# Patient Record
Sex: MALE | Race: WHITE | NOT HISPANIC OR LATINO | Employment: OTHER | URBAN - NONMETROPOLITAN AREA
[De-identification: names, ages, dates, MRNs, and addresses within clinical notes are randomized per-mention and may not be internally consistent; named-entity substitution may affect disease eponyms.]

---

## 2019-12-05 ENCOUNTER — OFFICE VISIT (OUTPATIENT)
Dept: ORTHOPEDIC SURGERY | Facility: CLINIC | Age: 59
End: 2019-12-05

## 2019-12-05 VITALS
OXYGEN SATURATION: 99 % | DIASTOLIC BLOOD PRESSURE: 110 MMHG | WEIGHT: 170.1 LBS | HEART RATE: 60 BPM | HEIGHT: 68 IN | TEMPERATURE: 98.1 F | BODY MASS INDEX: 25.78 KG/M2 | SYSTOLIC BLOOD PRESSURE: 160 MMHG

## 2019-12-05 DIAGNOSIS — M25.512 ACUTE PAIN OF LEFT SHOULDER: Primary | ICD-10-CM

## 2019-12-05 DIAGNOSIS — M75.52 SUBACROMIAL BURSITIS OF LEFT SHOULDER JOINT: ICD-10-CM

## 2019-12-05 PROCEDURE — 99202 OFFICE O/P NEW SF 15 MIN: CPT | Performed by: ORTHOPAEDIC SURGERY

## 2019-12-05 NOTE — PROGRESS NOTES
Dell Colón is a 59 y.o. male   Primary provider:  Provider, No Known       Chief Complaint   Patient presents with   • Left Shoulder - Pain, Initial Evaluation       HISTORY OF PRESENT ILLNESS: Left shoulder pain.  Patient states he tripped over a chain saw (not running) and landed on back of left shoulder.    11/16/19 Pain scale today 6/10    This is the first office visit for evaluation of left shoulder pain.    Mr. Spivey is 59 years old and right-hand dominant.  About 2 weeks ago he tripped and fell backwards landing on his left shoulder.  He had prompt onset of pain in the shoulder.  He denies any previous problems with the left shoulder.  However he did have problems with the right shoulder in the past which responded well to injection therapy.  Complains of pain in the shoulder and proximal arm worse at night and also worse with abduction.  Treatment has included oral steroids and Naprosyn.  He had brief improvement of his symptoms with the steroids.  There is been no numbness or tingling in the limb.    He is taking no medications and has no allergies.  He does not smoke.  His general health is good.  He is employed as a pharmacist.    Pain   This is a new problem. The current episode started 1 to 4 weeks ago. The problem occurs constantly (moderate). The problem has been unchanged. Associated symptoms comments: Stabbing aching and burning and clicking/popping. Exacerbated by: driving and sleeping. He has tried rest and NSAIDs for the symptoms. The treatment provided no relief.        CONCURRENT MEDICAL HISTORY:    History reviewed. No pertinent past medical history.    No Known Allergies    No current outpatient medications on file.    No past surgical history on file.    History reviewed. No pertinent family history.     Social History     Socioeconomic History   • Marital status:      Spouse name: Not on file   • Number of children: Not on file   • Years of education: Not on file   •  "Highest education level: Not on file   Tobacco Use   • Smoking status: Never Smoker   • Smokeless tobacco: Never Used   Substance and Sexual Activity   • Alcohol use: Yes     Comment: 1/day, 5/week   • Drug use: No   • Sexual activity: Yes     Partners: Female        Review of Systems   Musculoskeletal:        Left shoulder pain   Psychiatric/Behavioral: Positive for sleep disturbance.   Review of systems is positive as noted in history of present illness.      PHYSICAL EXAMINATION:       BP (!) 160/110 (BP Location: Right arm, Patient Position: Sitting, Cuff Size: Adult)   Pulse 60   Temp 98.1 °F (36.7 °C)   Ht 172.7 cm (68\")   Wt 77.2 kg (170 lb 1.6 oz)   SpO2 99%   BMI 25.86 kg/m²     Physical Exam in general he is thin but otherwise healthy-appearing alert and pleasant.  He responds appropriately to questions and commands.    GAIT:     [x]  Normal  []  Antalgic    Assistive device: []  None  []  Walker     []  Crutches  []  Cane     []  Wheelchair  []  Stretcher    Ortho Exam is directed to the upper extremities.  Biceps contours are normal.  With some encouragement he can demonstrate full active elevation of the left shoulder with complaints of pain.  The acromioclavicular joint is nontender.  There is no scapulothoracic tenderness.  Strength of abduction is mildly decreased with complaints of pain.  Strength of internal and external rotation appear normal.  Impingement testing produces mild discomfort.  Shoulder motion is smooth.  Radial pulses strong.  Sensory exam is intact to soft touch.  There is a subcutaneous mass overlying the upper portion of the trapezius on the right just lateral to the midline.  This has the feel of a lipoma.  There is no associated tenderness.    Xr Shoulder 2+ View Left    Result Date: 12/5/2019  Ordering Provider:  Zeyad Brandt MD Ordering Diagnosis/Indication:  Acute pain of left shoulder Procedure:  XR SHOULDER 2+ VW LEFT Exam Date:  12/5/19 COMPARISON: None      " Radiographs of the left shoulder 2 AP views done today show moderate degenerative change of the acromioclavicular joint.  The glenohumeral joint is unremarkable.  The acromiohumeral space is normal. Zeyad Brandt MD 12/5/19      No results found.        ASSESSMENT: Left shoulder pain probably secondary to rotator cuff tendinitis.    The natural history of the disorder and treatment options were reviewed.  I think the prognosis is good for solution of his symptoms with nonoperative care.    He was given Thera-Band and instructed in strengthening exercises for the shoulder rotators.  Potential benefits of injection therapy were discussed.  He wished to proceed with this.    The left shoulder was prepped.  Skin was infiltrated with 1% Xylocaine with epinephrine.  The subacromial bursa was then injected with a mixture of Marcaine and Kenalog and Xylocaine with epinephrine.  There were no complications and he reported improvement in pain with shoulder motion.    Return here in 1 month if his symptoms have not improved.    Diagnoses and all orders for this visit:    Acute pain of left shoulder  -     XR Shoulder 2+ View Left          PLAN    No Follow-up on file.    Zeyad Brandt MD

## 2020-01-28 ENCOUNTER — OFFICE VISIT (OUTPATIENT)
Dept: SURGERY | Facility: CLINIC | Age: 60
End: 2020-01-28

## 2020-01-28 VITALS
TEMPERATURE: 97 F | BODY MASS INDEX: 25.79 KG/M2 | HEIGHT: 68 IN | HEART RATE: 65 BPM | WEIGHT: 170.2 LBS | DIASTOLIC BLOOD PRESSURE: 84 MMHG | SYSTOLIC BLOOD PRESSURE: 140 MMHG

## 2020-01-28 DIAGNOSIS — K40.90 LEFT INGUINAL HERNIA: Primary | ICD-10-CM

## 2020-01-28 PROCEDURE — 99203 OFFICE O/P NEW LOW 30 MIN: CPT | Performed by: SURGERY

## 2020-01-28 NOTE — PROGRESS NOTES
CHIEF COMPLAINT:    Left inguinal hernia    HISTORY OF PRESENT ILLNESS:    Dell Colón is a 59 y.o. male who presents to discuss what he believes is a left inguinal hernia.  Intermittently over the last several weeks the patient has noticed a bulge in his left groin which progressively enlarges throughout the day.  This is associated with some dull pain in the area.  He has no obstructive symptoms.  He notes that the bulge reduces on its own with rest and can also be manually reduced if needed.    No pertinent past medical history.    pertinent surgical history: Vasectomy    Prior to Admission medications    Not on File       No Known Allergies    History reviewed. No pertinent family history.    Social History     Socioeconomic History   • Marital status:      Spouse name: Not on file   • Number of children: Not on file   • Years of education: Not on file   • Highest education level: Not on file   Tobacco Use   • Smoking status: Never Smoker   • Smokeless tobacco: Never Used   Substance and Sexual Activity   • Alcohol use: Yes     Comment: Occasionally   • Drug use: No   • Sexual activity: Yes     Partners: Female       Review of Systems   Constitutional: Negative for activity change, appetite change, chills and fever.   HENT: Negative for hearing loss, nosebleeds and trouble swallowing.    Cardiovascular: Negative for chest pain, palpitations and leg swelling.   Gastrointestinal: Negative for abdominal distention, abdominal pain, anal bleeding, blood in stool, constipation, diarrhea, nausea, rectal pain and vomiting.   Endocrine: Negative for cold intolerance, heat intolerance, polydipsia and polyuria.   Genitourinary: Negative for decreased urine volume, difficulty urinating, dysuria, enuresis, frequency, hematuria and urgency.   Musculoskeletal: Negative for arthralgias, back pain, gait problem, myalgias and neck pain.   Skin: Negative for pallor, rash and wound.   Allergic/Immunologic: Negative for  "immunocompromised state.   Neurological: Negative for dizziness, seizures, weakness, light-headedness, numbness and headaches.   Psychiatric/Behavioral: Negative for agitation and behavioral problems. The patient is not nervous/anxious.        Objective     /84   Pulse 65   Temp 97 °F (36.1 °C) (Temporal)   Ht 172.7 cm (68\")   Wt 77.2 kg (170 lb 3.2 oz)   BMI 25.88 kg/m²     Physical Exam   Constitutional: He is oriented to person, place, and time. He appears well-developed and well-nourished.   HENT:   Head: Normocephalic and atraumatic.   Nose: Nose normal.   Eyes: Conjunctivae and EOM are normal. Right eye exhibits no discharge. Left eye exhibits no discharge.   Neck: Trachea normal, normal range of motion and phonation normal. Neck supple. No JVD present. No tracheal deviation and no edema present. No thyromegaly present.   Cardiovascular: Normal rate, regular rhythm and normal heart sounds. Exam reveals no gallop and no friction rub.   No murmur heard.  Pulmonary/Chest: Effort normal and breath sounds normal. No accessory muscle usage. No respiratory distress. He has no decreased breath sounds. He has no wheezes. He has no rales. He exhibits no tenderness.   Abdominal: Soft. He exhibits no distension, no fluid wave, no ascites, no pulsatile midline mass and no mass. There is no tenderness. There is no rebound and no guarding. A hernia is present. Hernia confirmed positive in the right inguinal area and confirmed positive in the left inguinal area.       Musculoskeletal: Normal range of motion. He exhibits no edema, tenderness or deformity.   Lymphadenopathy:     He has no cervical adenopathy.        Left: No supraclavicular adenopathy present.   Neurological: He is alert and oriented to person, place, and time. He has normal strength. No cranial nerve deficit.   Skin: Skin is warm and dry. No rash noted. He is not diaphoretic. No erythema. No pallor.   Psychiatric: He has a normal mood and affect. " His speech is normal and behavior is normal. Judgment and thought content normal. Cognition and memory are normal.   Vitals reviewed.        ASSESSMENT:    Left inguinal hernia    PLAN:    He has a left inguinal hernia which is easily reducible and minimally symptomatic.  At this point he would like to pursue watchful waiting.  He will call or return if he desires repair of the hernia.  The signs and symptoms of incarceration and obstruction due to the hernia were discussed with the patient.  He understands that this would be a surgical emergency and he would need to seek medical care immediately for these problems.          This document has been electronically signed by Zeyad Castillo MD on January 28, 2020 9:44 AM

## 2020-03-16 ENCOUNTER — OFFICE VISIT (OUTPATIENT)
Dept: SURGERY | Facility: CLINIC | Age: 60
End: 2020-03-16

## 2020-03-16 VITALS
BODY MASS INDEX: 26.01 KG/M2 | HEIGHT: 68 IN | HEART RATE: 78 BPM | WEIGHT: 171.6 LBS | DIASTOLIC BLOOD PRESSURE: 82 MMHG | SYSTOLIC BLOOD PRESSURE: 150 MMHG | TEMPERATURE: 97 F

## 2020-03-16 DIAGNOSIS — K40.90 LEFT INGUINAL HERNIA: Primary | ICD-10-CM

## 2020-03-16 PROCEDURE — 99212 OFFICE O/P EST SF 10 MIN: CPT | Performed by: SURGERY

## 2020-03-16 RX ORDER — IBUPROFEN 200 MG
200 TABLET ORAL EVERY 6 HOURS PRN
COMMUNITY
End: 2022-01-12

## 2020-03-16 NOTE — PROGRESS NOTES
CHIEF COMPLAINT:    Chief Complaint   Patient presents with   • Follow-up     Michelle - Schedule [facility] surgery       HISTORY OF PRESENT ILLNESS:    Dell Colón is a 59 y.o. male who was seen previously in January for a symptomatic left inguinal hernia.  He comes in today desiring repair of this hernia.  He states that he has some discomfort typically in the evenings after standing all day.  He notes that the hernia continues to be reducible.    EXAM:  Vitals:    03/16/20 0854   BP: 150/82   Pulse: 78   Temp: 97 °F (36.1 °C)         No acute distress    ASSESSMENT:    Symptomatic left inguinal hernia    PLAN:    Unfortunately, due to the restrictions on scheduling elective surgical procedures at this time I cannot schedule him for his hernia repair as it is not an emergency.  I discussed this with him and he is understanding of this.  We will call and schedule him when possible.          This document has been electronically signed by Zeyad Castillo MD on March 16, 2020 09:48

## 2022-01-12 ENCOUNTER — LAB (OUTPATIENT)
Dept: LAB | Facility: HOSPITAL | Age: 62
End: 2022-01-12

## 2022-01-12 ENCOUNTER — OFFICE VISIT (OUTPATIENT)
Dept: FAMILY MEDICINE CLINIC | Facility: CLINIC | Age: 62
End: 2022-01-12

## 2022-01-12 VITALS
WEIGHT: 181.8 LBS | HEART RATE: 88 BPM | OXYGEN SATURATION: 99 % | BODY MASS INDEX: 27.55 KG/M2 | SYSTOLIC BLOOD PRESSURE: 166 MMHG | DIASTOLIC BLOOD PRESSURE: 102 MMHG | HEIGHT: 68 IN

## 2022-01-12 DIAGNOSIS — Z76.89 ENCOUNTER TO ESTABLISH CARE: ICD-10-CM

## 2022-01-12 DIAGNOSIS — Z13.29 SCREENING FOR THYROID DISORDER: ICD-10-CM

## 2022-01-12 DIAGNOSIS — Z11.59 NEED FOR HEPATITIS C SCREENING TEST: ICD-10-CM

## 2022-01-12 DIAGNOSIS — Z13.220 SCREENING FOR HYPERCHOLESTEROLEMIA: ICD-10-CM

## 2022-01-12 DIAGNOSIS — Z12.11 SCREENING FOR COLON CANCER: ICD-10-CM

## 2022-01-12 DIAGNOSIS — I10 PRIMARY HYPERTENSION: ICD-10-CM

## 2022-01-12 DIAGNOSIS — I10 PRIMARY HYPERTENSION: Primary | ICD-10-CM

## 2022-01-12 DIAGNOSIS — Z12.5 SCREENING FOR PROSTATE CANCER: ICD-10-CM

## 2022-01-12 LAB
ALBUMIN SERPL-MCNC: 4.8 G/DL (ref 3.5–5.2)
ALBUMIN/GLOB SERPL: 2.1 G/DL
ALP SERPL-CCNC: 80 U/L (ref 39–117)
ALT SERPL W P-5'-P-CCNC: 21 U/L (ref 1–41)
ANION GAP SERPL CALCULATED.3IONS-SCNC: 6.8 MMOL/L (ref 5–15)
AST SERPL-CCNC: 20 U/L (ref 1–40)
BILIRUB SERPL-MCNC: 0.5 MG/DL (ref 0–1.2)
BUN SERPL-MCNC: 17 MG/DL (ref 8–23)
BUN/CREAT SERPL: 15.3 (ref 7–25)
CALCIUM SPEC-SCNC: 9.6 MG/DL (ref 8.6–10.5)
CHLORIDE SERPL-SCNC: 107 MMOL/L (ref 98–107)
CHOLEST SERPL-MCNC: 182 MG/DL (ref 0–200)
CO2 SERPL-SCNC: 28.2 MMOL/L (ref 22–29)
CREAT SERPL-MCNC: 1.11 MG/DL (ref 0.76–1.27)
GFR SERPL CREATININE-BSD FRML MDRD: 67 ML/MIN/1.73
GLOBULIN UR ELPH-MCNC: 2.3 GM/DL
GLUCOSE SERPL-MCNC: 111 MG/DL (ref 65–99)
HCV AB SER DONR QL: NORMAL
HDLC SERPL-MCNC: 47 MG/DL (ref 40–60)
LDLC SERPL CALC-MCNC: 123 MG/DL (ref 0–100)
LDLC/HDLC SERPL: 2.61 {RATIO}
POTASSIUM SERPL-SCNC: 4 MMOL/L (ref 3.5–5.2)
PROT SERPL-MCNC: 7.1 G/DL (ref 6–8.5)
PSA SERPL-MCNC: 2.57 NG/ML (ref 0–4)
SODIUM SERPL-SCNC: 142 MMOL/L (ref 136–145)
TRIGL SERPL-MCNC: 61 MG/DL (ref 0–150)
TSH SERPL DL<=0.05 MIU/L-ACNC: 1.05 UIU/ML (ref 0.27–4.2)
VLDLC SERPL-MCNC: 12 MG/DL (ref 5–40)

## 2022-01-12 PROCEDURE — 86803 HEPATITIS C AB TEST: CPT

## 2022-01-12 PROCEDURE — 80061 LIPID PANEL: CPT

## 2022-01-12 PROCEDURE — 99202 OFFICE O/P NEW SF 15 MIN: CPT | Performed by: NURSE PRACTITIONER

## 2022-01-12 PROCEDURE — 80053 COMPREHEN METABOLIC PANEL: CPT

## 2022-01-12 PROCEDURE — 84443 ASSAY THYROID STIM HORMONE: CPT

## 2022-01-12 PROCEDURE — G0103 PSA SCREENING: HCPCS

## 2022-01-12 RX ORDER — LISINOPRIL 10 MG/1
10 TABLET ORAL DAILY
Qty: 30 TABLET | Refills: 1 | Status: SHIPPED | OUTPATIENT
Start: 2022-01-12 | End: 2022-01-25 | Stop reason: DRUGHIGH

## 2022-01-12 NOTE — PROGRESS NOTES
"Chief Complaint  Establish Care (elevated bp)    Subjective  Encounter to establish care.  Has been having episodes of high blood pressure over the last one to two months.        Dell Colón presents to [facility] [place]  Hypertension  This is a new problem. The current episode started more than 1 month ago. The problem has been gradually worsening since onset. The problem is uncontrolled. There are no associated agents to hypertension. Risk factors for coronary artery disease include male gender and family history. Past treatments include nothing. Current antihypertension treatment includes nothing. The current treatment provides no improvement.       Objective   Vital Signs:   BP (!) 166/102   Pulse 88   Ht 172.7 cm (68\")   Wt 82.5 kg (181 lb 12.8 oz)   SpO2 99%   BMI 27.64 kg/m²     Physical Exam  Vitals and nursing note reviewed.   Constitutional:       Appearance: He is well-developed.   HENT:      Head: Normocephalic.      Right Ear: External ear normal.      Left Ear: External ear normal.   Eyes:      Pupils: Pupils are equal, round, and reactive to light.   Cardiovascular:      Rate and Rhythm: Normal rate and regular rhythm.      Heart sounds: Normal heart sounds.   Pulmonary:      Effort: Pulmonary effort is normal.      Breath sounds: Normal breath sounds.   Abdominal:      General: Bowel sounds are normal.      Palpations: Abdomen is soft.   Musculoskeletal:         General: Normal range of motion.      Cervical back: Normal range of motion and neck supple.   Skin:     General: Skin is warm.      Capillary Refill: Capillary refill takes less than 2 seconds.   Neurological:      Mental Status: He is alert and oriented to person, place, and time.   Psychiatric:         Behavior: Behavior normal.        Result Review :                   Assessment and Plan    Diagnoses and all orders for this visit:    1. Primary hypertension (Primary)  -     CBC & " Differential; Future  -     Comprehensive Metabolic Panel; Future  -     lisinopril (PRINIVIL,ZESTRIL) 10 MG tablet; Take 1 tablet by mouth Daily.  Dispense: 30 tablet; Refill: 1    2. Need for hepatitis C screening test  -     Hepatitis C Antibody; Future    3. Screening for hypercholesterolemia  -     Lipid Panel; Future    4. Screening for thyroid disorder  -     TSH; Future    5. Screening for prostate cancer  -     PSA Screen; Future    6. Screening for colon cancer  -     Cologuard - Stool, Per Rectum; Future    7. Encounter to establish care    1.  Primary hypertension:  Complete CBC and chemistry panel as ordered and will notify of results when available  Begin lisinopril as prescribed  Educated on possible side effects of this medication including not limited to increased risk for dry cough, hypotension  Reduce sodium intake to no more than 1500 mg/day  Will check blood pressure 2-3 times per week and bring log of readings to next appointment    2.  Need for hepatitis C screening test:  Complete hepatitis C antibody as ordered and will notify of results when available    3.  Screening for hypercholesterolemia:  Complete fasting lipid panel as ordered and will notify of results when available    4.  Screening for thyroid disorder:  Complete TSH as ordered and will notify of results when available    5.  Screening for prostate cancer:  Complete PSA as ordered and will notify of results when available    6.  Screening for colon cancer:  Order placed for Cologuard and will notify of results when available    7.  Encounter to establish care:    I spent 19 minutes caring for Dell on this date of service. This time includes time spent by me in the following activities:preparing for the visit, reviewing tests, obtaining and/or reviewing a separately obtained history, performing a medically appropriate examination and/or evaluation , counseling and educating the patient/family/caregiver, ordering medications,  tests, or procedures and documenting information in the medical record  Follow Up   Return in about 4 weeks (around 2/9/2022) for Annual physical.  Patient was given instructions and counseling regarding his condition or for health maintenance advice. Please see specific information pulled into the AVS if appropriate.         This document has been electronically signed by ANTHONY Sanford on January 12, 2022 09:56 CST

## 2022-01-13 ENCOUNTER — LAB (OUTPATIENT)
Dept: LAB | Facility: HOSPITAL | Age: 62
End: 2022-01-13

## 2022-01-13 DIAGNOSIS — I10 PRIMARY HYPERTENSION: ICD-10-CM

## 2022-01-13 LAB
BASOPHILS # BLD AUTO: 0.05 10*3/MM3 (ref 0–0.2)
BASOPHILS NFR BLD AUTO: 0.8 % (ref 0–1.5)
DEPRECATED RDW RBC AUTO: 38.8 FL (ref 37–54)
EOSINOPHIL # BLD AUTO: 0.32 10*3/MM3 (ref 0–0.4)
EOSINOPHIL NFR BLD AUTO: 4.8 % (ref 0.3–6.2)
ERYTHROCYTE [DISTWIDTH] IN BLOOD BY AUTOMATED COUNT: 12.8 % (ref 12.3–15.4)
HCT VFR BLD AUTO: 44.7 % (ref 37.5–51)
HGB BLD-MCNC: 15.5 G/DL (ref 13–17.7)
IMM GRANULOCYTES # BLD AUTO: 0.03 10*3/MM3 (ref 0–0.05)
IMM GRANULOCYTES NFR BLD AUTO: 0.5 % (ref 0–0.5)
LYMPHOCYTES # BLD AUTO: 2.05 10*3/MM3 (ref 0.7–3.1)
LYMPHOCYTES NFR BLD AUTO: 31 % (ref 19.6–45.3)
MCH RBC QN AUTO: 29.2 PG (ref 26.6–33)
MCHC RBC AUTO-ENTMCNC: 34.7 G/DL (ref 31.5–35.7)
MCV RBC AUTO: 84.3 FL (ref 79–97)
MONOCYTES # BLD AUTO: 0.52 10*3/MM3 (ref 0.1–0.9)
MONOCYTES NFR BLD AUTO: 7.9 % (ref 5–12)
NEUTROPHILS NFR BLD AUTO: 3.65 10*3/MM3 (ref 1.7–7)
NEUTROPHILS NFR BLD AUTO: 55 % (ref 42.7–76)
NRBC BLD AUTO-RTO: 0 /100 WBC (ref 0–0.2)
PLATELET # BLD AUTO: 280 10*3/MM3 (ref 140–450)
PMV BLD AUTO: 11.2 FL (ref 6–12)
RBC # BLD AUTO: 5.3 10*6/MM3 (ref 4.14–5.8)
WBC NRBC COR # BLD: 6.62 10*3/MM3 (ref 3.4–10.8)

## 2022-01-13 PROCEDURE — 85025 COMPLETE CBC W/AUTO DIFF WBC: CPT

## 2022-01-17 ENCOUNTER — TELEPHONE (OUTPATIENT)
Dept: FAMILY MEDICINE CLINIC | Facility: CLINIC | Age: 62
End: 2022-01-17

## 2022-01-17 NOTE — PROGRESS NOTES
Per ANTHONY Vanegas, Mr. Colón has been called with recent lab results & recommendations.  Continue current medications and follow-up as planned or sooner if any problems.

## 2022-01-17 NOTE — TELEPHONE ENCOUNTER
Per ANTHONY Vanegas, Mr. Colón has been called with recent lab results & recommendations.  Continue current medications and follow-up as planned or sooner if any problems.       ----- Message from ANTHONY Sanford sent at 1/13/2022  6:39 AM CST -----  Fasting glucose was slightly elevated at 111.  He needs to reduce concentrated sweets and carbohydrates in his diet.  Bad cholesterol was also slightly elevated.  He needs to reduce saturated fats in his diet.  All other labs were essentially normal.

## 2022-01-25 DIAGNOSIS — I10 PRIMARY HYPERTENSION: Primary | ICD-10-CM

## 2022-01-25 RX ORDER — LISINOPRIL 30 MG/1
30 TABLET ORAL DAILY
Qty: 30 TABLET | Refills: 5 | Status: SHIPPED | OUTPATIENT
Start: 2022-01-25 | End: 2022-02-08 | Stop reason: DRUGHIGH

## 2022-02-08 ENCOUNTER — OFFICE VISIT (OUTPATIENT)
Dept: FAMILY MEDICINE CLINIC | Facility: CLINIC | Age: 62
End: 2022-02-08

## 2022-02-08 VITALS
OXYGEN SATURATION: 97 % | DIASTOLIC BLOOD PRESSURE: 82 MMHG | HEIGHT: 68 IN | SYSTOLIC BLOOD PRESSURE: 114 MMHG | HEART RATE: 84 BPM | WEIGHT: 175.6 LBS | BODY MASS INDEX: 26.61 KG/M2

## 2022-02-08 DIAGNOSIS — I10 PRIMARY HYPERTENSION: ICD-10-CM

## 2022-02-08 DIAGNOSIS — R73.01 ELEVATED FASTING BLOOD SUGAR: ICD-10-CM

## 2022-02-08 DIAGNOSIS — Z13.1 SCREENING FOR DIABETES MELLITUS: ICD-10-CM

## 2022-02-08 DIAGNOSIS — R22.1 NECK MASS: ICD-10-CM

## 2022-02-08 DIAGNOSIS — E78.00 ELEVATED LDL CHOLESTEROL LEVEL: ICD-10-CM

## 2022-02-08 DIAGNOSIS — Z00.00 ANNUAL PHYSICAL EXAM: Primary | ICD-10-CM

## 2022-02-08 PROCEDURE — 99396 PREV VISIT EST AGE 40-64: CPT | Performed by: NURSE PRACTITIONER

## 2022-02-08 RX ORDER — AMLODIPINE BESYLATE 5 MG/1
5 TABLET ORAL DAILY
Qty: 30 TABLET | Refills: 1 | Status: CANCELLED | OUTPATIENT
Start: 2022-02-08

## 2022-02-08 RX ORDER — AMLODIPINE BESYLATE 5 MG/1
5 TABLET ORAL DAILY
COMMUNITY
End: 2022-02-08 | Stop reason: SDUPTHER

## 2022-02-08 RX ORDER — LISINOPRIL 40 MG/1
40 TABLET ORAL DAILY
Qty: 30 TABLET | Refills: 1 | Status: SHIPPED | OUTPATIENT
Start: 2022-02-08 | End: 2022-04-01 | Stop reason: SDUPTHER

## 2022-02-08 RX ORDER — AMLODIPINE BESYLATE 5 MG/1
5 TABLET ORAL NIGHTLY
Qty: 30 TABLET | Refills: 1 | Status: SHIPPED | OUTPATIENT
Start: 2022-02-08 | End: 2022-03-09 | Stop reason: DRUGHIGH

## 2022-02-08 RX ORDER — AMLODIPINE BESYLATE 5 MG/1
5 TABLET ORAL DAILY
Qty: 30 TABLET | Refills: 1 | Status: SHIPPED | OUTPATIENT
Start: 2022-02-08 | End: 2022-02-08

## 2022-02-08 RX ORDER — AMLODIPINE BESYLATE 5 MG/1
5 TABLET ORAL NIGHTLY
Qty: 30 TABLET | Refills: 1
Start: 2022-02-08 | End: 2022-02-08 | Stop reason: SDUPTHER

## 2022-02-08 NOTE — PROGRESS NOTES
"Chief Complaint  Annual Exam    Subjective  Annual physical exam.  Has high blood pressure and has been on lisinopril 30 mg.  \"I still had symptomatic 10 mg left over and I was also taking some of my wife's amlodipine 5 mg.  Has been taking a total of 40 mg of lisinopril in the morning and 5 mg of amlodipine in the morning.\"  Reports am blood pressure readings in a.m. and have been slightly higher than in the afternoon.  Has a new mass on the right side of his neck that has been there for several years.  \"Dr. Brandt told me it was probably a lipoma but I am ready to have it taken off.\"        Dell Colón presents to [facility] [place]  Hypertension  This is a new problem. The current episode started more than 1 month ago. The problem has been gradually worsening since onset. The problem is uncontrolled. There are no associated agents to hypertension. Risk factors for coronary artery disease include male gender and family history. Past treatments include nothing. Current antihypertension treatment includes nothing. The current treatment provides no improvement.     Current Outpatient Medications on File Prior to Visit   Medication Sig Dispense Refill   • [DISCONTINUED] amLODIPine (NORVASC) 5 MG tablet Take 5 mg by mouth Daily.     • [DISCONTINUED] lisinopril (PRINIVIL,ZESTRIL) 30 MG tablet Take 1 tablet by mouth Daily. 30 tablet 5     No current facility-administered medications on file prior to visit.    No Known Allergies    Objective   Vital Signs:   /82   Pulse 84   Ht 172.7 cm (68\")   Wt 79.7 kg (175 lb 9.6 oz)   SpO2 97%   BMI 26.70 kg/m²     Physical Exam  Vitals and nursing note reviewed.   Constitutional:       Appearance: Normal appearance. He is well-developed and overweight.   HENT:      Head: Normocephalic.      Right Ear: Tympanic membrane, ear canal and external ear normal.      Left Ear: Tympanic membrane, ear canal and external ear " normal.   Eyes:      Pupils: Pupils are equal, round, and reactive to light.   Neck:     Cardiovascular:      Rate and Rhythm: Normal rate and regular rhythm.      Heart sounds: Normal heart sounds.   Pulmonary:      Effort: Pulmonary effort is normal.      Breath sounds: Normal breath sounds.   Abdominal:      General: Bowel sounds are normal.      Palpations: Abdomen is soft.   Musculoskeletal:         General: Normal range of motion.      Cervical back: Normal range of motion and neck supple.   Skin:     General: Skin is warm.      Capillary Refill: Capillary refill takes less than 2 seconds.   Neurological:      Mental Status: He is alert and oriented to person, place, and time.   Psychiatric:         Behavior: Behavior normal.        Result Review :     Common labs    Common Labsle 1/12/22 1/12/22 1/12/22 1/13/22    1018 1018 1018    Glucose 111 (A)      BUN 17      Creatinine 1.11      eGFR Non African Am 67      Sodium 142      Potassium 4.0      Chloride 107      Calcium 9.6      Albumin 4.80      Total Bilirubin 0.5      Alkaline Phosphatase 80      AST (SGOT) 20      ALT (SGPT) 21      WBC    6.62   Hemoglobin    15.5   Hematocrit    44.7   Platelets    280   Total Cholesterol  182     Triglycerides  61     HDL Cholesterol  47     LDL Cholesterol   123 (A)     PSA   2.570    (A) Abnormal value                      Assessment and Plan    Diagnoses and all orders for this visit:    1. Annual physical exam (Primary)    2. Primary hypertension  -     lisinopril (PRINIVIL,ZESTRIL) 40 MG tablet; Take 1 tablet by mouth Daily.  Dispense: 30 tablet; Refill: 1  -     Discontinue: amLODIPine (NORVASC) 5 MG tablet; Take 1 tablet by mouth Daily.  Dispense: 30 tablet; Refill: 1  -     Basic Metabolic Panel; Future  -     amLODIPine (NORVASC) 5 MG tablet; Take 1 tablet by mouth Every Night.  Dispense: 30 tablet; Refill: 1    3. Elevated LDL cholesterol level  -     Lipid Panel; Future    4. Elevated fasting blood  sugar  -     Hemoglobin A1c; Future    5. Neck mass  -     US Head Neck Soft Tissue  -     Ambulatory Referral to General Surgery    6. Screening for diabetes mellitus    1.  Annual physical exam:  Continue on current medications as previously prescribed   Counseling on importance of heathy eating habits and regular physical activity regimen on improving overall physical and mental health.     2.  Primary hypertension:  Continue lisinopril 40 mg p.o. every morning and prescription sent to pharmacy  Continue on amlodipine 5 mg to be taken p.o. nightly  Reduce sodium intake to 1500 mg/day  Complete BMP as ordered will notify results when available    3.  Elevated LDL:  Complete fasting lipid panel as ordered and will notify of results when available  Continue to adhere to a low-fat diet    4.  Elevated fasting blood sugar:  Complete hemoglobin A1c as ordered will notify of results when available  Reduce concentrated sweets carbohydrates in diet    5.  Neck mass,  Radiology will call to schedule ultrasound of soft tissue of the neck and will notify results when available  Referral placed to general surgeon for further evaluation and treatment options of neck mass    I spent 31 minutes caring for Dell on this date of service. This time includes time spent by me in the following activities:preparing for the visit, reviewing tests, obtaining and/or reviewing a separately obtained history, performing a medically appropriate examination and/or evaluation , counseling and educating the patient/family/caregiver, ordering medications, tests, or procedures, referring and communicating with other health care professionals , documenting information in the medical record and care coordination  Follow Up   Return in about 1 year (around 2/8/2023) for Annual physical.  Patient was given instructions and counseling regarding his condition or for health maintenance advice. Please see specific information pulled into the AVS if  appropriate.         This document has been electronically signed by ANTHONY Sanford on February 8, 2022 11:50 CST

## 2022-02-14 ENCOUNTER — HOSPITAL ENCOUNTER (OUTPATIENT)
Dept: ULTRASOUND IMAGING | Facility: HOSPITAL | Age: 62
Discharge: HOME | End: 2022-02-14
Admitting: NURSE PRACTITIONER

## 2022-02-14 PROCEDURE — 76536 US EXAM OF HEAD AND NECK: CPT

## 2022-02-15 ENCOUNTER — TELEPHONE (OUTPATIENT)
Dept: FAMILY MEDICINE CLINIC | Facility: CLINIC | Age: 62
End: 2022-02-15

## 2022-02-15 DIAGNOSIS — R22.1 NECK MASS: Primary | ICD-10-CM

## 2022-02-15 DIAGNOSIS — R93.89 ABNORMAL ULTRASOUND OF NECK: ICD-10-CM

## 2022-02-15 NOTE — PROGRESS NOTES
Per ANTHONY Vanegas, Mr. Colón has been called with recent Neck, Soft Tissue US  results & recommendations.  Continue current medications and follow-up as planned or sooner if any problems.

## 2022-02-15 NOTE — TELEPHONE ENCOUNTER
Per ANTHONY Vanegas, Mr. Colón has been called with recent Neck, Soft Tissue US  results & recommendations.  Continue current medications and follow-up as planned or sooner if any problems.     ----- Message from ANTHONY Sanford sent at 2/15/2022  6:43 AM CST -----  Ultrasound of the neck showed subcutaneous lesion measuring 6.1 x 1.8 cm.  Differential considerations could include lipoma, liposarcoma or possible malignant lymph node.  Radiologist is recommending CT of the neck with contrast.  I have placed the order in radiology will contact him to schedule an appointment.

## 2022-02-16 ENCOUNTER — OFFICE VISIT (OUTPATIENT)
Dept: SURGERY | Facility: CLINIC | Age: 62
End: 2022-02-16

## 2022-02-16 VITALS
WEIGHT: 177 LBS | HEIGHT: 68 IN | HEART RATE: 82 BPM | TEMPERATURE: 97 F | SYSTOLIC BLOOD PRESSURE: 130 MMHG | DIASTOLIC BLOOD PRESSURE: 72 MMHG | BODY MASS INDEX: 26.83 KG/M2

## 2022-02-16 DIAGNOSIS — D17.1 LIPOMA OF TORSO: Primary | ICD-10-CM

## 2022-02-16 PROCEDURE — 99213 OFFICE O/P EST LOW 20 MIN: CPT | Performed by: SURGERY

## 2022-02-16 NOTE — PATIENT INSTRUCTIONS
"BMI for Adults  What is BMI?  Body mass index (BMI) is a number that is calculated from a person's weight and height. BMI can help estimate how much of a person's weight is composed of fat. BMI does not measure body fat directly. Rather, it is an alternative to procedures that directly measure body fat, which can be difficult and expensive.  BMI can help identify people who may be at higher risk for certain medical problems.  What are BMI measurements used for?  BMI is used as a screening tool to identify possible weight problems. It helps determine whether a person is obese, overweight, a healthy weight, or underweight.  BMI is useful for:  · Identifying a weight problem that may be related to a medical condition or may increase the risk for medical problems.  · Promoting changes, such as changes in diet and exercise, to help reach a healthy weight. BMI screening can be repeated to see if these changes are working.  How is BMI calculated?  BMI involves measuring your weight in relation to your height. Both height and weight are measured, and the BMI is calculated from those numbers. This can be done either in English (U[place] or metric measurements. Note that charts and online BMI calculators are available to help you find your BMI quickly and easily without having to do these calculations yourself.  To calculate your BMI in English (U.S[place] measurements:    1. Measure your weight in pounds (lb).  2. Multiply the number of pounds by 703.  ? For example, for a person who weighs 180 lb, multiply that number by 703, which equals 126,540.  3. Measure your height in inches. Then multiply that number by itself to get a measurement called \"inches squared.\"  ? For example, for a person who is 70 inches tall, the \"inches squared\" measurement is 70 inches x 70 inches, which equals 4,900 inches squared.  4. Divide the total from step 2 (number of lb x 703) by the total from step 3 (inches squared): 126,540 ÷ 4,900 = 25.8. This is " "your BMI.    To calculate your BMI in metric measurements:  1. Measure your weight in kilograms (kg).  2. Measure your height in meters (m). Then multiply that number by itself to get a measurement called \"meters squared.\"  ? For example, for a person who is 1.75 m tall, the \"meters squared\" measurement is 1.75 m x 1.75 m, which is equal to 3.1 meters squared.  3. Divide the number of kilograms (your weight) by the meters squared number. In this example: 70 ÷ 3.1 = 22.6. This is your BMI.  What do the results mean?  BMI charts are used to identify whether you are underweight, normal weight, overweight, or obese. The following guidelines will be used:  · Underweight: BMI less than 18.5.  · Normal weight: BMI between 18.5 and 24.9.  · Overweight: BMI between 25 and 29.9.  · Obese: BMI of 30 or above.  Keep these notes in mind:  · Weight includes both fat and muscle, so someone with a muscular build, such as an athlete, may have a BMI that is higher than 24.9. In cases like these, BMI is not an accurate measure of body fat.  · To determine if excess body fat is the cause of a BMI of 25 or higher, further assessments may need to be done by a health care provider.  · BMI is usually interpreted in the same way for men and women.  Where to find more information  For more information about BMI, including tools to quickly calculate your BMI, go to these websites:  · Centers for Disease Control and Prevention: www.cdc.gov  · American Heart Association: www.heart.org  · National Heart, Lung, and Blood [place]: www.nhlbi.nih.gov  Summary  · Body mass index (BMI) is a number that is calculated from a person's weight and height.  · BMI may help estimate how much of a person's weight is composed of fat. BMI can help identify those who may be at higher risk for certain medical problems.  · BMI can be measured using English measurements or metric measurements.  · BMI charts are used to identify whether you are underweight, normal " weight, overweight, or obese.  This information is not intended to replace advice given to you by your health care provider. Make sure you discuss any questions you have with your health care provider.  Document Revised: 09/09/2020 Document Reviewed: 07/17/2020  Elsevier Patient Education © 2021 [employer] Inc.

## 2022-02-16 NOTE — PROGRESS NOTES
61-year-old male who is referred for evaluation of likely lipoma at the base of his neck posteriorly on the right side.  Patient states has been there for years he is always been told it was a lipoma.  He does really have any symptoms related to this.  Mass has slowly gotten larger over the years.  Patient had an ultrasound done of the area which suggested subcutaneous.  Ultrasound measurements were 6 x 1.8 cm    Vitals:    02/16/22 0917   BP: 130/72   Pulse: 82   Temp: 97 °F (36.1 °C)       Allergies: No Known Allergies    Home Medications:  Prior to Admission medications    Medication Sig Start Date End Date Taking? Authorizing Provider   amLODIPine (NORVASC) 5 MG tablet Take 1 tablet by mouth Every Night. 2/8/22  Yes Romina Ruiz APRN   lisinopril (PRINIVIL,ZESTRIL) 40 MG tablet Take 1 tablet by mouth Daily. 2/8/22  Yes Romina Ruiz APRN       Social History     Socioeconomic History   • Marital status:    Tobacco Use   • Smoking status: Never Smoker   • Smokeless tobacco: Never Used   Substance and Sexual Activity   • Alcohol use: Yes     Comment: Occasionally   • Drug use: No   • Sexual activity: Yes     Partners: Female       Past Medical History:   Diagnosis Date   • Inguinal hernia        Family History   Problem Relation Age of Onset   • Lung cancer Mother    • Hypertension Mother    • Lung cancer Father        History reviewed. No pertinent surgical history.    Review of systems  Denies chest pain  Denies shortness of breath  Does not take any anticoagulants  Denies any abdominal pain  Denies any neck or radicular pain down either arm      Alert appropriate  Nontoxic  Nonicteric  Posteriorly at the base of his neck upper back he has a 6 x 11 cm mass that does not feel like it is fixed to the underlying structures.  Does not interfere with movement of his neck or shoulder.  There is no skin abnormalities.  No redness no tenderness  Rest of the neck unremarkable  Lungs clear  Heart regular rate  and rhythm  Vascular grossly intact  Skin is warm and dry  No chest deformity otherwise    Assessment and plan  Soft tissue mass likely lipoma base of the neck upper back right side.  It is large enough that it should at least be biopsied if not excised.  Fully discussed that with the patient he clearly understands excisional biopsy and wishes to proceed with that at this point.  He clearly understands the procedure alternatives risk and benefits and wishes to proceed

## 2022-03-01 PROCEDURE — 87635 SARS-COV-2 COVID-19 AMP PRB: CPT | Performed by: NURSE PRACTITIONER

## 2022-03-03 ENCOUNTER — ANESTHESIA EVENT (OUTPATIENT)
Dept: PERIOP | Facility: HOSPITAL | Age: 62
End: 2022-03-03

## 2022-03-04 ENCOUNTER — ANESTHESIA (OUTPATIENT)
Dept: PERIOP | Facility: HOSPITAL | Age: 62
End: 2022-03-04

## 2022-03-04 ENCOUNTER — HOSPITAL ENCOUNTER (OUTPATIENT)
Facility: HOSPITAL | Age: 62
Setting detail: HOSPITAL OUTPATIENT SURGERY
Discharge: HOME | End: 2022-03-04
Attending: SURGERY | Admitting: SURGERY

## 2022-03-04 VITALS
OXYGEN SATURATION: 95 % | RESPIRATION RATE: 18 BRPM | HEIGHT: 68 IN | TEMPERATURE: 97 F | DIASTOLIC BLOOD PRESSURE: 88 MMHG | BODY MASS INDEX: 27.4 KG/M2 | SYSTOLIC BLOOD PRESSURE: 168 MMHG | HEART RATE: 66 BPM | WEIGHT: 180.78 LBS

## 2022-03-04 DIAGNOSIS — D17.1 LIPOMA OF TORSO: ICD-10-CM

## 2022-03-04 LAB
QT INTERVAL: 404 MS
QTC INTERVAL: 436 MS

## 2022-03-04 PROCEDURE — 93005 ELECTROCARDIOGRAM TRACING: CPT | Performed by: ANESTHESIOLOGY

## 2022-03-04 PROCEDURE — 93010 ELECTROCARDIOGRAM REPORT: CPT | Performed by: INTERNAL MEDICINE

## 2022-03-04 PROCEDURE — 25010000002 MIDAZOLAM PER 1 MG: Performed by: NURSE ANESTHETIST, CERTIFIED REGISTERED

## 2022-03-04 PROCEDURE — 25010000002 PROPOFOL 10 MG/ML EMULSION: Performed by: NURSE ANESTHETIST, CERTIFIED REGISTERED

## 2022-03-04 PROCEDURE — 25010000002 FENTANYL CITRATE (PF) 50 MCG/ML SOLUTION: Performed by: NURSE ANESTHETIST, CERTIFIED REGISTERED

## 2022-03-04 PROCEDURE — 21552 EXC NECK LES SC 3 CM/>: CPT | Performed by: SURGERY

## 2022-03-04 PROCEDURE — 25010000002 ONDANSETRON PER 1 MG: Performed by: NURSE ANESTHETIST, CERTIFIED REGISTERED

## 2022-03-04 PROCEDURE — 21552 EXC NECK LES SC 3 CM/>: CPT | Performed by: SPECIALIST/TECHNOLOGIST, OTHER

## 2022-03-04 RX ORDER — KETAMINE HCL IN NACL, ISO-OSM 100MG/10ML
SYRINGE (ML) INJECTION AS NEEDED
Status: DISCONTINUED | OUTPATIENT
Start: 2022-03-04 | End: 2022-03-04 | Stop reason: SURG

## 2022-03-04 RX ORDER — HYDROCODONE BITARTRATE AND ACETAMINOPHEN 5; 325 MG/1; MG/1
1 TABLET ORAL EVERY 6 HOURS PRN
Qty: 6 TABLET | Refills: 0 | Status: SHIPPED | OUTPATIENT
Start: 2022-03-04 | End: 2023-02-20

## 2022-03-04 RX ORDER — SODIUM CHLORIDE, SODIUM GLUCONATE, SODIUM ACETATE, POTASSIUM CHLORIDE AND MAGNESIUM CHLORIDE 526; 502; 368; 37; 30 MG/100ML; MG/100ML; MG/100ML; MG/100ML; MG/100ML
1000 INJECTION, SOLUTION INTRAVENOUS CONTINUOUS PRN
Status: DISCONTINUED | OUTPATIENT
Start: 2022-03-04 | End: 2022-03-04 | Stop reason: HOSPADM

## 2022-03-04 RX ORDER — FENTANYL CITRATE 50 UG/ML
INJECTION, SOLUTION INTRAMUSCULAR; INTRAVENOUS AS NEEDED
Status: DISCONTINUED | OUTPATIENT
Start: 2022-03-04 | End: 2022-03-04 | Stop reason: SURG

## 2022-03-04 RX ORDER — PROPOFOL 10 MG/ML
VIAL (ML) INTRAVENOUS CONTINUOUS PRN
Status: DISCONTINUED | OUTPATIENT
Start: 2022-03-04 | End: 2022-03-04 | Stop reason: SURG

## 2022-03-04 RX ORDER — ONDANSETRON 2 MG/ML
4 INJECTION INTRAMUSCULAR; INTRAVENOUS ONCE
Status: COMPLETED | OUTPATIENT
Start: 2022-03-04 | End: 2022-03-04

## 2022-03-04 RX ORDER — MIDAZOLAM HYDROCHLORIDE 1 MG/ML
INJECTION INTRAMUSCULAR; INTRAVENOUS AS NEEDED
Status: DISCONTINUED | OUTPATIENT
Start: 2022-03-04 | End: 2022-03-04 | Stop reason: SURG

## 2022-03-04 RX ADMIN — FENTANYL CITRATE 25 MCG: 50 INJECTION INTRAMUSCULAR; INTRAVENOUS at 08:05

## 2022-03-04 RX ADMIN — PROPOFOL 20 MG: 10 INJECTION, EMULSION INTRAVENOUS at 08:01

## 2022-03-04 RX ADMIN — FENTANYL CITRATE 50 MCG: 50 INJECTION INTRAMUSCULAR; INTRAVENOUS at 07:47

## 2022-03-04 RX ADMIN — MIDAZOLAM HYDROCHLORIDE 2 MG: 1 INJECTION, SOLUTION INTRAMUSCULAR; INTRAVENOUS at 07:43

## 2022-03-04 RX ADMIN — ONDANSETRON 4 MG: 2 INJECTION INTRAMUSCULAR; INTRAVENOUS at 09:17

## 2022-03-04 RX ADMIN — Medication 10 MG: at 08:05

## 2022-03-04 RX ADMIN — PROPOFOL 50 MCG/KG/MIN: 10 INJECTION, EMULSION INTRAVENOUS at 07:49

## 2022-03-04 RX ADMIN — SODIUM CHLORIDE, SODIUM GLUCONATE, SODIUM ACETATE, POTASSIUM CHLORIDE AND MAGNESIUM CHLORIDE 1000 ML: 526; 502; 368; 37; 30 INJECTION, SOLUTION INTRAVENOUS at 06:17

## 2022-03-04 RX ADMIN — PROPOFOL 30 MG: 10 INJECTION, EMULSION INTRAVENOUS at 08:10

## 2022-03-04 RX ADMIN — Medication 20 MG: at 07:47

## 2022-03-04 RX ADMIN — PROPOFOL 20 MG: 10 INJECTION, EMULSION INTRAVENOUS at 07:47

## 2022-03-04 RX ADMIN — SODIUM CHLORIDE, SODIUM GLUCONATE, SODIUM ACETATE, POTASSIUM CHLORIDE AND MAGNESIUM CHLORIDE 1000 ML: 526; 502; 368; 37; 30 INJECTION, SOLUTION INTRAVENOUS at 09:17

## 2022-03-04 NOTE — ANESTHESIA POSTPROCEDURE EVALUATION
Patient: Dell Colón    Procedure Summary     Date: 03/04/22 Room / Location: [facility] OR 09 / [facility] OR    Anesthesia Start: 0743 Anesthesia Stop: 0835    Procedure: EXCISE SOFT TISSUE MASS BASE OF NECK UPPER  BACK AREA (Right ) Diagnosis:       Lipoma of torso      (Lipoma of torso [D17.1])    Surgeons: Mook Luis MD Provider: Kevin Alonzo MD    Anesthesia Type: MAC ASA Status: 2          Anesthesia Type: MAC    Vitals  No vitals data found for the desired time range.          Post Anesthesia Care and Evaluation    Patient location during evaluation: bedside  Patient participation: complete - patient participated  Level of consciousness: awake and awake and alert  Pain score: 0  Pain management: adequate  Airway patency: patent  Anesthetic complications: No anesthetic complications  PONV Status: none  Cardiovascular status: acceptable and stable  Respiratory status: acceptable, room air and spontaneous ventilation  Hydration status: acceptable    Comments: BP:  131/82  HR:  73  SAT:  95  RR:  18  TEMP: 97.3

## 2022-03-04 NOTE — OP NOTE
MASS SOFT TISSUE EXCISION  Procedure Note    Dell Colón  3/4/2022    Pre-op Diagnosis:   Lipoma of torso [D17.1]    Post-op Diagnosis:     Post-Op Diagnosis Codes:     * Lipoma of torso [D17.1]    Procedure/CPT® Codes:      Procedure(s):  EXCISE SOFT TISSUE MASS BASE OF NECK UPPER  BACK AREA    Surgeon(s):  Mook Luis MD    Anesthesia: Choice    Staff:   Circulator: Marcie Early RN  Scrub Person: Consuelo Chacko  Assistant: Kasia Domínguez CSA    Assistant: Kasia Domínguez CSA was responsible for performing the following activities: Retraction, Suction, Irrigation, Suturing, Closing and Placing Dressing and their skilled assistance was necessary for the success of this case.     Estimated Blood Loss: none    Specimens:                ID Type Source Tests Collected by Time   A (Not marked as sent) : upper back soft tissue  mass Tissue Back, Upper TISSUE PATHOLOGY EXAM Mook Luis MD 3/4/2022 0823         Drains: * No LDAs found *    Indications: 61-year-old male with clinically suspicious subcutaneous lipoma base of the right neck posteriorly presents now for excisional biopsy.    Findings: Subcutaneous lipoma completely excised base of right neck.  Incision at completion was 6.5 cm in length    Complications: None    Procedure:  The patient was brought to the operating room where she was placed under MAC anesthesia without any difficulties. She had SCD's in place. He was placed left side down, right side up. All pressure points were padded and supported. We prepped the area and draped in normal sterile fashion. Appropriate timeout was taken. We mapped out an incision over the long axis of the mass. We infiltrated the overlying skin with local. Skin incision was made sharply down to this mass and then using sharp electrocautery dissection the partially encapsulated lipoma was completely excised. The fascia was not involved. We took the mass off of the top of the fascia, again with  electrocautery. The entire specimen appeared to be removed. There were a few other small areas of fat that we removed as well. We felt that we got the entire lipoma out. We irrigated, good hemostasis was noted. The wound was then closed with interrupted 3-0 Vicryl and running 4-0 Monocryl subcuticular stitch. Glue was used for final skin closure. The patient was awakened and transferred back to same day surgery in stable condition.         Disposition: Transfer to [facility] in stable condition        Mook Luis MD     Date: 3/4/2022  Time: 08:31 CST

## 2022-03-04 NOTE — ANESTHESIA PREPROCEDURE EVALUATION
Anesthesia Evaluation     no history of anesthetic complications:  NPO Solid Status: > 8 hours  NPO Liquid Status: > 8 hours           Airway   Mallampati: I  TM distance: >3 FB  Neck ROM: full  No difficulty expected  Dental - normal exam     Pulmonary - normal exam    breath sounds clear to auscultation  (-) COPD, asthma, sleep apnea, not a smoker  Cardiovascular - normal exam  Exercise tolerance: good (4-7 METS)    ECG reviewed  Rhythm: regular  Rate: normal    (+) hypertension well controlled 2 medications or greater,   (-) valvular problems/murmurs, dysrhythmias, angina, cardiac stents, DVT, hyperlipidemia    ROS comment: Normal sinus rhythm  Normal ECG  No previous ECGs available    Neuro/Psych  (-) seizures, TIA, CVA, headaches, weakness, numbness, psychiatric history  GI/Hepatic/Renal/Endo    (+)  GERD well controlled,    (-) hepatitis, liver disease, no renal disease, diabetes, no thyroid disorder    Musculoskeletal (-) negative ROS    Abdominal    Substance History   (+) alcohol use (occ),   (-) drug use     OB/GYN          Other        (-) history of cancer  ROS/Med Hx Other: Markedly heterogeneous subcutaneous lesion measuring 6.1 x 1.8 cm exhibiting minimal vascularity. Differential considerations  include lipoma, liposarcoma, or malignant lymph node. Further evaluation with CT neck with contrast is recommended.                  Anesthesia Plan    ASA 2     MAC   (Patient requesting MAC over Gen)  intravenous induction     Anesthetic plan, all risks, benefits, and alternatives have been provided, discussed and informed consent has been obtained with: patient.        CODE STATUS:

## 2022-03-04 NOTE — INTERVAL H&P NOTE
H&P reviewed. The patient was examined and there are no changes to the H&P.      Temp:  [97 °F (36.1 °C)] 97 °F (36.1 °C)  Heart Rate:  [58] 58  Resp:  [20] 20  BP: (145)/(70) 145/70

## 2022-03-08 LAB
LAB AP CASE REPORT: NORMAL
PATH REPORT.FINAL DX SPEC: NORMAL

## 2022-03-09 ENCOUNTER — OFFICE VISIT (OUTPATIENT)
Dept: SURGERY | Facility: CLINIC | Age: 62
End: 2022-03-09

## 2022-03-09 VITALS
SYSTOLIC BLOOD PRESSURE: 120 MMHG | TEMPERATURE: 97.2 F | HEART RATE: 80 BPM | HEIGHT: 68 IN | DIASTOLIC BLOOD PRESSURE: 74 MMHG | BODY MASS INDEX: 27.28 KG/M2 | WEIGHT: 180 LBS

## 2022-03-09 DIAGNOSIS — I10 PRIMARY HYPERTENSION: ICD-10-CM

## 2022-03-09 DIAGNOSIS — D17.1 LIPOMA OF TORSO: Primary | ICD-10-CM

## 2022-03-09 DIAGNOSIS — I10 PRIMARY HYPERTENSION: Primary | ICD-10-CM

## 2022-03-09 PROCEDURE — 99024 POSTOP FOLLOW-UP VISIT: CPT | Performed by: SURGERY

## 2022-03-09 RX ORDER — LISINOPRIL 40 MG/1
40 TABLET ORAL EVERY MORNING
Qty: 30 TABLET | Refills: 3 | Status: SHIPPED | OUTPATIENT
Start: 2022-03-09 | End: 2022-04-01 | Stop reason: SDUPTHER

## 2022-03-09 RX ORDER — AMLODIPINE BESYLATE 10 MG/1
10 TABLET ORAL DAILY
Qty: 30 TABLET | Refills: 3 | Status: SHIPPED | OUTPATIENT
Start: 2022-03-09 | End: 2022-04-01 | Stop reason: SDUPTHER

## 2022-03-09 NOTE — PROGRESS NOTES
61-year-old gentleman is now 1 week status post excision of large lipoma from his upper back lower neck area.  Pathology was a lipoma.  His incision is clean it appears to be healing appropriately.  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  Went over with him what to expect as far as any type of swelling or further issues with his wound.  He will follow up with us on a as needed basis

## 2022-04-01 DIAGNOSIS — I10 PRIMARY HYPERTENSION: ICD-10-CM

## 2022-04-01 RX ORDER — AMLODIPINE BESYLATE 10 MG/1
10 TABLET ORAL NIGHTLY
Qty: 90 TABLET | Refills: 3 | Status: SHIPPED | OUTPATIENT
Start: 2022-04-01 | End: 2023-02-20 | Stop reason: SDUPTHER

## 2022-04-01 RX ORDER — LISINOPRIL 40 MG/1
40 TABLET ORAL EVERY MORNING
Qty: 90 TABLET | Refills: 3 | Status: SHIPPED | OUTPATIENT
Start: 2022-04-01 | End: 2023-02-20 | Stop reason: SDUPTHER

## 2022-05-20 RX ORDER — SILDENAFIL CITRATE 20 MG/1
TABLET ORAL
Qty: 30 TABLET | Refills: 5 | Status: SHIPPED | OUTPATIENT
Start: 2022-05-20 | End: 2022-08-04 | Stop reason: SDUPTHER

## 2022-08-05 RX ORDER — SILDENAFIL CITRATE 20 MG/1
TABLET ORAL
Qty: 30 TABLET | Refills: 5 | Status: SHIPPED | OUTPATIENT
Start: 2022-08-05 | End: 2022-12-01 | Stop reason: SDUPTHER

## 2022-12-02 RX ORDER — SILDENAFIL CITRATE 20 MG/1
TABLET ORAL
Qty: 30 TABLET | Refills: 5 | Status: SHIPPED | OUTPATIENT
Start: 2022-12-02 | End: 2023-02-20 | Stop reason: SDUPTHER

## 2023-02-06 ENCOUNTER — LAB (OUTPATIENT)
Dept: LAB | Facility: HOSPITAL | Age: 63
End: 2023-02-06
Payer: COMMERCIAL

## 2023-02-06 DIAGNOSIS — E78.00 ELEVATED LDL CHOLESTEROL LEVEL: ICD-10-CM

## 2023-02-06 DIAGNOSIS — I10 PRIMARY HYPERTENSION: ICD-10-CM

## 2023-02-06 DIAGNOSIS — R73.01 ELEVATED FASTING BLOOD SUGAR: ICD-10-CM

## 2023-02-06 LAB
ANION GAP SERPL CALCULATED.3IONS-SCNC: 8.5 MMOL/L (ref 5–15)
BUN SERPL-MCNC: 16 MG/DL (ref 8–23)
BUN/CREAT SERPL: 13.4 (ref 7–25)
CALCIUM SPEC-SCNC: 9.3 MG/DL (ref 8.6–10.5)
CHLORIDE SERPL-SCNC: 106 MMOL/L (ref 98–107)
CHOLEST SERPL-MCNC: 165 MG/DL (ref 0–200)
CO2 SERPL-SCNC: 25.5 MMOL/L (ref 22–29)
CREAT SERPL-MCNC: 1.19 MG/DL (ref 0.76–1.27)
EGFRCR SERPLBLD CKD-EPI 2021: 68.2 ML/MIN/1.73
GLUCOSE SERPL-MCNC: 106 MG/DL (ref 65–99)
HBA1C MFR BLD: 5.9 % (ref 4.8–5.6)
HDLC SERPL-MCNC: 44 MG/DL (ref 40–60)
LDLC SERPL CALC-MCNC: 106 MG/DL (ref 0–100)
LDLC/HDLC SERPL: 2.4 {RATIO}
POTASSIUM SERPL-SCNC: 3.8 MMOL/L (ref 3.5–5.2)
SODIUM SERPL-SCNC: 140 MMOL/L (ref 136–145)
TRIGL SERPL-MCNC: 77 MG/DL (ref 0–150)
VLDLC SERPL-MCNC: 15 MG/DL (ref 5–40)

## 2023-02-06 PROCEDURE — 80061 LIPID PANEL: CPT

## 2023-02-06 PROCEDURE — 80048 BASIC METABOLIC PNL TOTAL CA: CPT

## 2023-02-06 PROCEDURE — 83036 HEMOGLOBIN GLYCOSYLATED A1C: CPT

## 2023-02-10 ENCOUNTER — TELEPHONE (OUTPATIENT)
Dept: FAMILY MEDICINE CLINIC | Facility: CLINIC | Age: 63
End: 2023-02-10
Payer: COMMERCIAL

## 2023-02-11 NOTE — TELEPHONE ENCOUNTER
Per ANTHONY Vanegas, Mr. Whitmore has been called with recent lab results & recommendations.  Continue current medications and follow-up as planned or sooner if any problems.       ----- Message from ANTHONY Sanford sent at 2/7/2023  6:41 AM CST -----  Bad cholesterol, while still slightly elevated, has improved and is down to 106.  He needs to a low-fat diet.  Fasting glucose was slightly elevated at 106 and hemoglobin A1c elevated at 5.9.  This makes him a prediabetic.  He needs to reduce concentrated sweets and carbohydrates in his diet.  If hemoglobin A1c reaches 6.5 he will be considered a type II diabetic.  All other labs were essentially normal.

## 2023-02-11 NOTE — PROGRESS NOTES
Per ANTHONY Vanegas, Mr. Whitmore has been called with recent lab results & recommendations.  Continue current medications and follow-up as planned or sooner if any problems.

## 2023-02-20 ENCOUNTER — LAB (OUTPATIENT)
Dept: LAB | Facility: HOSPITAL | Age: 63
End: 2023-02-20
Payer: COMMERCIAL

## 2023-02-20 ENCOUNTER — OFFICE VISIT (OUTPATIENT)
Dept: FAMILY MEDICINE CLINIC | Facility: CLINIC | Age: 63
End: 2023-02-20
Payer: COMMERCIAL

## 2023-02-20 VITALS
HEART RATE: 74 BPM | DIASTOLIC BLOOD PRESSURE: 74 MMHG | SYSTOLIC BLOOD PRESSURE: 132 MMHG | WEIGHT: 186.9 LBS | OXYGEN SATURATION: 95 % | HEIGHT: 68 IN | BODY MASS INDEX: 28.33 KG/M2

## 2023-02-20 DIAGNOSIS — Z12.5 SCREENING FOR PROSTATE CANCER: ICD-10-CM

## 2023-02-20 DIAGNOSIS — Z13.29 SCREENING FOR THYROID DISORDER: ICD-10-CM

## 2023-02-20 DIAGNOSIS — I10 PRIMARY HYPERTENSION: ICD-10-CM

## 2023-02-20 DIAGNOSIS — Z00.00 ANNUAL PHYSICAL EXAM: Primary | ICD-10-CM

## 2023-02-20 LAB
ALBUMIN SERPL-MCNC: 4.2 G/DL (ref 3.5–5.2)
ALP SERPL-CCNC: 76 U/L (ref 39–117)
ALT SERPL W P-5'-P-CCNC: 24 U/L (ref 1–41)
AST SERPL-CCNC: 21 U/L (ref 1–40)
BILIRUB CONJ SERPL-MCNC: <0.2 MG/DL (ref 0–0.3)
BILIRUB INDIRECT SERPL-MCNC: NORMAL MG/DL
BILIRUB SERPL-MCNC: 0.4 MG/DL (ref 0–1.2)
PROT SERPL-MCNC: 6.8 G/DL (ref 6–8.5)
PSA SERPL-MCNC: 2.94 NG/ML (ref 0–4)
TSH SERPL DL<=0.05 MIU/L-ACNC: 1.12 UIU/ML (ref 0.27–4.2)

## 2023-02-20 PROCEDURE — 99396 PREV VISIT EST AGE 40-64: CPT | Performed by: NURSE PRACTITIONER

## 2023-02-20 PROCEDURE — 80076 HEPATIC FUNCTION PANEL: CPT

## 2023-02-20 PROCEDURE — 36415 COLL VENOUS BLD VENIPUNCTURE: CPT

## 2023-02-20 PROCEDURE — 84443 ASSAY THYROID STIM HORMONE: CPT

## 2023-02-20 PROCEDURE — G0103 PSA SCREENING: HCPCS

## 2023-02-20 RX ORDER — AMLODIPINE BESYLATE 10 MG/1
10 TABLET ORAL NIGHTLY
Qty: 90 TABLET | Refills: 3 | Status: SHIPPED | OUTPATIENT
Start: 2023-02-20

## 2023-02-20 RX ORDER — SILDENAFIL CITRATE 20 MG/1
TABLET ORAL
Qty: 90 TABLET | Refills: 5 | Status: SHIPPED | OUTPATIENT
Start: 2023-02-20

## 2023-02-20 RX ORDER — LISINOPRIL 40 MG/1
40 TABLET ORAL EVERY MORNING
Qty: 90 TABLET | Refills: 3 | Status: SHIPPED | OUTPATIENT
Start: 2023-02-20

## 2023-02-20 NOTE — PROGRESS NOTES
"Chief Complaint  Annual Exam    Subjective  Annual physical exam.  Has high blood pressure that is currently controlled with amlodipine and lisinopril.        Dell Colón presents to [facility] [place]  History of Present Illness  HPI: Annual physical exam  Hypertension  This is a chronic problem. The current episode started more than 1 year ago. The problem is unchanged. The problem is controlled. There are no associated agents to hypertension. Risk factors for coronary artery disease include male gender and family history. Past treatments include nothing. Current antihypertension treatment includes calcium channel blockers and ACE inhibitors. The current treatment provides significant improvement. There are no compliance problems.      Current Outpatient Medications on File Prior to Visit   Medication Sig Dispense Refill   • [DISCONTINUED] amLODIPine (NORVASC) 10 MG tablet Take 1 tablet by mouth Every Night. 90 tablet 3   • [DISCONTINUED] lisinopril (PRINIVIL,ZESTRIL) 40 MG tablet Take 1 tablet by mouth Every Morning. 90 tablet 3   • [DISCONTINUED] sildenafil (REVATIO) 20 MG tablet Use 1-5 tablets as needed for erectile dysfunction. Do not exceed 100mg in 24 hours. 30 tablet 5   • [DISCONTINUED] HYDROcodone-acetaminophen (NORCO) 5-325 MG per tablet Take 1 tablet by mouth Every 6 (Six) Hours As Needed for Pain. 6 tablet 0     No current facility-administered medications on file prior to visit.       No Known Allergies    Objective   Vital Signs:  /74   Pulse 74   Ht 172.7 cm (68\")   Wt 84.8 kg (186 lb 14.4 oz)   SpO2 95%   BMI 28.42 kg/m²   Estimated body mass index is 28.42 kg/m² as calculated from the following:    Height as of this encounter: 172.7 cm (68\").    Weight as of this encounter: 84.8 kg (186 lb 14.4 oz).         Physical Exam  Vitals and nursing note reviewed.   Constitutional:       Appearance: Normal appearance. He is " well-developed.   HENT:      Head: Normocephalic and atraumatic.      Right Ear: Tympanic membrane, ear canal and external ear normal.      Left Ear: Tympanic membrane, ear canal and external ear normal.      Nose: Nose normal.      Mouth/Throat:      Mouth: Mucous membranes are moist.      Pharynx: Oropharynx is clear.   Eyes:      Extraocular Movements: Extraocular movements intact.      Conjunctiva/sclera: Conjunctivae normal.      Pupils: Pupils are equal, round, and reactive to light.   Cardiovascular:      Rate and Rhythm: Normal rate and regular rhythm.      Pulses: Normal pulses.      Heart sounds: Normal heart sounds.   Pulmonary:      Effort: Pulmonary effort is normal.      Breath sounds: Normal breath sounds.   Abdominal:      General: Bowel sounds are normal.      Palpations: Abdomen is soft.   Musculoskeletal:         General: Normal range of motion.      Cervical back: Normal range of motion and neck supple.   Skin:     General: Skin is warm.      Capillary Refill: Capillary refill takes less than 2 seconds.   Neurological:      Mental Status: He is alert and oriented to person, place, and time.   Psychiatric:         Behavior: Behavior normal.        Result Review :  The following data was reviewed by: ANTHONY Sanford on 02/20/2023:  CMP    CMP 2/6/23   Glucose 106 (A)   BUN 16   Creatinine 1.19   eGFR 68.2   Sodium 140   Potassium 3.8   Chloride 106   Calcium 9.3   BUN/Creatinine Ratio 13.4   Anion Gap 8.5   (A) Abnormal value                Most Recent A1C    HGBA1C Most Recent 2/6/23   Hemoglobin A1C 5.90 (A)   (A) Abnormal value                         Assessment and Plan   Diagnoses and all orders for this visit:    1. Annual physical exam (Primary)    2. Primary hypertension  -     Hepatic Function Panel; Future  -     amLODIPine (NORVASC) 10 MG tablet; Take 1 tablet by mouth Every Night.  Dispense: 90 tablet; Refill: 3  -     lisinopril (PRINIVIL,ZESTRIL) 40 MG tablet; Take 1 tablet by  mouth Every Morning.  Dispense: 90 tablet; Refill: 3    3. Screening for thyroid disorder  -     TSH; Future    4. Screening for prostate cancer  -     PSA Screen; Future    Other orders  -     sildenafil (REVATIO) 20 MG tablet; Use 1-5 tablets as needed for erectile dysfunction. Do not exceed 100mg in 24 hours.  Dispense: 90 tablet; Refill: 5      1.  Annual physical exam:  Refill prescription for sildenafil sent to pharmacy  Continue on current medications as previously prescribed   Counseling on importance of heathy eating habits and regular physical activity regimen on improving overall physical and mental health.     2.  Essential hypertension:  Normotensive  Continue amlodipine as previously prescribed  Continue lisinopril as previously prescribed  Continue to adhere to a low-sodium diet, avoid use of table salt  Target systolic BP to remain below 140    3.  Screening for thyroid disorder,  Complete TSH as ordered will notify of results when available    4.  Screening for prostate cancer:  Complete PSA as ordered will notify of results when available         Follow Up   Return in about 1 year (around 2/20/2024) for Annual physical.  Patient was given instructions and counseling regarding his condition or for health maintenance advice. Please see specific information pulled into the AVS if appropriate.         This document has been electronically signed by ANTHONY Sanford on February 20, 2023 08:21 CST

## 2023-08-31 DIAGNOSIS — I10 PRIMARY HYPERTENSION: Primary | ICD-10-CM

## 2023-08-31 RX ORDER — AMLODIPINE BESYLATE 5 MG/1
5 TABLET ORAL DAILY
Qty: 90 TABLET | Refills: 3 | Status: SHIPPED | OUTPATIENT
Start: 2023-08-31

## 2025-02-03 ENCOUNTER — OFFICE VISIT (OUTPATIENT)
Dept: FAMILY MEDICINE CLINIC | Facility: CLINIC | Age: 65
End: 2025-02-03
Payer: COMMERCIAL

## 2025-02-03 VITALS
HEIGHT: 68 IN | TEMPERATURE: 98.2 F | SYSTOLIC BLOOD PRESSURE: 158 MMHG | DIASTOLIC BLOOD PRESSURE: 88 MMHG | BODY MASS INDEX: 28.49 KG/M2 | HEART RATE: 61 BPM | WEIGHT: 188 LBS | RESPIRATION RATE: 18 BRPM | OXYGEN SATURATION: 97 %

## 2025-02-03 DIAGNOSIS — Z23 NEED FOR TDAP VACCINATION: ICD-10-CM

## 2025-02-03 DIAGNOSIS — I10 PRIMARY HYPERTENSION: ICD-10-CM

## 2025-02-03 DIAGNOSIS — Z76.89 ENCOUNTER TO ESTABLISH CARE: Primary | ICD-10-CM

## 2025-02-03 DIAGNOSIS — Z12.11 SCREENING FOR COLON CANCER: ICD-10-CM

## 2025-02-03 DIAGNOSIS — E66.3 OVERWEIGHT WITH BODY MASS INDEX (BMI) OF 28 TO 28.9 IN ADULT: ICD-10-CM

## 2025-02-03 PROCEDURE — 99213 OFFICE O/P EST LOW 20 MIN: CPT | Performed by: NURSE PRACTITIONER

## 2025-02-03 RX ORDER — IBUPROFEN 200 MG
200 TABLET ORAL EVERY 6 HOURS PRN
COMMUNITY

## 2025-02-03 RX ORDER — AMLODIPINE BESYLATE 10 MG/1
10 TABLET ORAL DAILY
Qty: 90 TABLET | Refills: 1 | Status: SHIPPED | OUTPATIENT
Start: 2025-02-03

## 2025-02-03 RX ORDER — ACETAMINOPHEN 500 MG
500 TABLET ORAL EVERY 6 HOURS PRN
COMMUNITY

## 2025-02-03 RX ORDER — AMLODIPINE BESYLATE 5 MG/1
5 TABLET ORAL DAILY
Start: 2025-02-03 | End: 2025-02-03

## 2025-02-03 NOTE — PROGRESS NOTES
"Chief Complaint   Patient presents with    Establish Care    Hypertension        Subjective   Dell Colón is a 64 y.o. male who presents today for the following: establish care    HPI   He does admit to diabetes and hypertension in family. He has a family history of lung cancer for mother and father who are both smokers.   He does take blood pressure medication.     No Known Allergies      OBJECTIVE:  Vitals:    02/03/25 1038 02/03/25 1043   BP: 162/89 158/88   BP Location: Left arm Left arm   Patient Position: Sitting Sitting   Cuff Size: Adult Adult   Pulse: 61    Resp: 18    Temp: 98.2 °F (36.8 °C)    TempSrc: Infrared    SpO2: 97%    Weight: 85.3 kg (188 lb)    Height: 172.7 cm (68\")      Physical Exam  Vitals and nursing note reviewed.   Constitutional:       Appearance: Normal appearance.   Cardiovascular:      Rate and Rhythm: Normal rate and regular rhythm.      Pulses: Normal pulses.      Heart sounds: Normal heart sounds.   Pulmonary:      Effort: Pulmonary effort is normal.      Breath sounds: Normal breath sounds.   Skin:     General: Skin is warm and dry.   Neurological:      General: No focal deficit present.      Mental Status: He is alert and oriented to person, place, and time.   Psychiatric:         Mood and Affect: Mood normal.         Behavior: Behavior normal.         Thought Content: Thought content normal.         Judgment: Judgment normal.         BMI is >= 25 and <30. (Overweight) The following options were offered after discussion;: weight loss educational material (shared in after visit summary), exercise counseling/recommendations, and nutrition counseling/recommendations          ASSESSMENT/ PLAN:    Diagnoses and all orders for this visit:    1. Encounter to establish care (Primary)    2. Primary hypertension  -     Discontinue: amLODIPine (Norvasc) 5 MG tablet; Take 1 tablet by mouth Daily.  -     amLODIPine (NORVASC) 10 MG tablet; Take 1 tablet by mouth Daily.  Dispense: 90 " tablet; Refill: 1    3. Screening for colon cancer  -     Cologuard - Stool, Per Rectum; Future    4. Need for Tdap vaccination  -     Cancel: Tdap Vaccine => 8yo IM (BOOSTRIX/ADACEL)    5. Overweight with body mass index (BMI) of 28 to 28.9 in adult      Procedures     Management Plan:   Take all medications as ordered.  An After Visit Summary was printed and given to the patient at discharge.    Follow-up: Return if symptoms worsen or fail to improve.         Atul Ballard, APRN 2/3/2025 12:47 CST  This note was electronically signed.

## 2025-02-04 PROBLEM — I10 PRIMARY HYPERTENSION: Status: ACTIVE | Noted: 2025-02-04

## 2025-02-04 PROBLEM — E66.3 OVERWEIGHT WITH BODY MASS INDEX (BMI) OF 28 TO 28.9 IN ADULT: Status: ACTIVE | Noted: 2025-02-04

## 2025-02-05 ENCOUNTER — PATIENT ROUNDING (BHMG ONLY) (OUTPATIENT)
Dept: FAMILY MEDICINE CLINIC | Facility: CLINIC | Age: 65
End: 2025-02-05
Payer: COMMERCIAL

## 2025-02-05 NOTE — PROGRESS NOTES
February 5, 2025    Hello, may I speak with Dell Colón?    My name is Norah      I am  with [facility]  [address] [id number] [phone].    Before we get started may I verify your date of birth? 1960    I am calling to officially welcome you to our practice and ask about your recent visit. Is this a good time to talk?   YES    Tell me about your visit with us. What things went well?         We're always looking for ways to make our patients' experiences even better. Do you have recommendations on ways we may improve?  NO    Overall were you satisfied with your first visit to our practice? YES       I appreciate you taking the time to speak with me today. Is there anything else I can do for you? NO    SPOKE WITH PATIENT AND HE WAS HAPPY WITH VISIT.    Thank you, and have a great day.

## 2025-02-13 ENCOUNTER — LAB (OUTPATIENT)
Dept: FAMILY MEDICINE CLINIC | Facility: CLINIC | Age: 65
End: 2025-02-13
Payer: COMMERCIAL

## 2025-02-13 DIAGNOSIS — R53.82 CHRONIC FATIGUE: ICD-10-CM

## 2025-02-13 DIAGNOSIS — R73.01 IMPAIRED FASTING GLUCOSE: ICD-10-CM

## 2025-02-13 DIAGNOSIS — Z13.6 SCREENING FOR CARDIOVASCULAR CONDITION: ICD-10-CM

## 2025-02-13 DIAGNOSIS — Z00.00 ENCOUNTER FOR ANNUAL PHYSICAL EXAM: Primary | ICD-10-CM

## 2025-02-14 LAB
ALBUMIN SERPL-MCNC: 4.3 G/DL (ref 3.9–4.9)
ALP SERPL-CCNC: 94 IU/L (ref 44–121)
ALT SERPL-CCNC: 24 IU/L (ref 0–44)
AST SERPL-CCNC: 20 IU/L (ref 0–40)
BASOPHILS # BLD AUTO: 0.1 X10E3/UL (ref 0–0.2)
BASOPHILS NFR BLD AUTO: 1 %
BILIRUB SERPL-MCNC: 0.3 MG/DL (ref 0–1.2)
BUN SERPL-MCNC: 17 MG/DL (ref 8–27)
BUN/CREAT SERPL: 15 (ref 10–24)
CALCIUM SERPL-MCNC: 10.1 MG/DL (ref 8.6–10.2)
CHLORIDE SERPL-SCNC: 107 MMOL/L (ref 96–106)
CHOLEST SERPL-MCNC: 160 MG/DL (ref 100–199)
CO2 SERPL-SCNC: 23 MMOL/L (ref 20–29)
CREAT SERPL-MCNC: 1.16 MG/DL (ref 0.76–1.27)
EGFRCR SERPLBLD CKD-EPI 2021: 69 ML/MIN/1.73
EOSINOPHIL # BLD AUTO: 0.4 X10E3/UL (ref 0–0.4)
EOSINOPHIL NFR BLD AUTO: 5 %
ERYTHROCYTE [DISTWIDTH] IN BLOOD BY AUTOMATED COUNT: 12.4 % (ref 11.6–15.4)
GLOBULIN SER CALC-MCNC: 2.2 G/DL (ref 1.5–4.5)
GLUCOSE SERPL-MCNC: 99 MG/DL (ref 70–99)
HBA1C MFR BLD: 6.1 % (ref 4.8–5.6)
HCT VFR BLD AUTO: 46.7 % (ref 37.5–51)
HDLC SERPL-MCNC: 40 MG/DL
HGB BLD-MCNC: 15.8 G/DL (ref 13–17.7)
IMM GRANULOCYTES # BLD AUTO: 0 X10E3/UL (ref 0–0.1)
IMM GRANULOCYTES NFR BLD AUTO: 0 %
LDLC SERPL CALC-MCNC: 105 MG/DL (ref 0–99)
LDLC/HDLC SERPL: 2.6 RATIO (ref 0–3.6)
LYMPHOCYTES # BLD AUTO: 1.8 X10E3/UL (ref 0.7–3.1)
LYMPHOCYTES NFR BLD AUTO: 27 %
MCH RBC QN AUTO: 29.4 PG (ref 26.6–33)
MCHC RBC AUTO-ENTMCNC: 33.8 G/DL (ref 31.5–35.7)
MCV RBC AUTO: 87 FL (ref 79–97)
MONOCYTES # BLD AUTO: 0.5 X10E3/UL (ref 0.1–0.9)
MONOCYTES NFR BLD AUTO: 8 %
NEUTROPHILS # BLD AUTO: 4 X10E3/UL (ref 1.4–7)
NEUTROPHILS NFR BLD AUTO: 59 %
PLATELET # BLD AUTO: 274 X10E3/UL (ref 150–450)
POTASSIUM SERPL-SCNC: 5.2 MMOL/L (ref 3.5–5.2)
PROT SERPL-MCNC: 6.5 G/DL (ref 6–8.5)
RBC # BLD AUTO: 5.38 X10E6/UL (ref 4.14–5.8)
SODIUM SERPL-SCNC: 142 MMOL/L (ref 134–144)
T4 SERPL-MCNC: 8 UG/DL (ref 4.5–12)
TRIGL SERPL-MCNC: 79 MG/DL (ref 0–149)
TSH SERPL DL<=0.005 MIU/L-ACNC: 0.9 UIU/ML (ref 0.45–4.5)
VLDLC SERPL CALC-MCNC: 15 MG/DL (ref 5–40)
WBC # BLD AUTO: 6.8 X10E3/UL (ref 3.4–10.8)

## 2025-02-26 ENCOUNTER — OFFICE VISIT (OUTPATIENT)
Dept: FAMILY MEDICINE CLINIC | Facility: CLINIC | Age: 65
End: 2025-02-26
Payer: COMMERCIAL

## 2025-02-26 VITALS
HEIGHT: 68 IN | BODY MASS INDEX: 28.34 KG/M2 | OXYGEN SATURATION: 96 % | DIASTOLIC BLOOD PRESSURE: 64 MMHG | WEIGHT: 187 LBS | HEART RATE: 63 BPM | RESPIRATION RATE: 17 BRPM | TEMPERATURE: 98.3 F | SYSTOLIC BLOOD PRESSURE: 136 MMHG

## 2025-02-26 DIAGNOSIS — Z12.5 SPECIAL SCREENING FOR MALIGNANT NEOPLASM OF PROSTATE: ICD-10-CM

## 2025-02-26 DIAGNOSIS — R97.20 ELEVATED PROSTATE SPECIFIC ANTIGEN (PSA): Chronic | ICD-10-CM

## 2025-02-26 DIAGNOSIS — N52.9 ERECTILE DYSFUNCTION, UNSPECIFIED ERECTILE DYSFUNCTION TYPE: Chronic | ICD-10-CM

## 2025-02-26 DIAGNOSIS — E78.00 ELEVATED LDL CHOLESTEROL LEVEL: Chronic | ICD-10-CM

## 2025-02-26 DIAGNOSIS — I10 PRIMARY HYPERTENSION: Chronic | ICD-10-CM

## 2025-02-26 DIAGNOSIS — E66.3 OVERWEIGHT WITH BODY MASS INDEX (BMI) OF 28 TO 28.9 IN ADULT: Chronic | ICD-10-CM

## 2025-02-26 DIAGNOSIS — R73.03 PREDIABETES: Chronic | ICD-10-CM

## 2025-02-26 DIAGNOSIS — Z00.00 ENCOUNTER FOR ANNUAL PHYSICAL EXAM: Primary | ICD-10-CM

## 2025-02-26 PROCEDURE — 99397 PER PM REEVAL EST PAT 65+ YR: CPT | Performed by: NURSE PRACTITIONER

## 2025-02-26 NOTE — PROGRESS NOTES
"Chief Complaint   Patient presents with    Annual Exam        Subjective   Dell Colón is a 64 y.o. male who presents today for the following: annual physical exam with lab review.     HPI   Patient does admit to gaining weight over the winter and wanting to lose it and would like a medication to help him.     No Known Allergies      OBJECTIVE:  Vitals:    02/26/25 0823   BP: 136/64   BP Location: Left arm   Patient Position: Sitting   Cuff Size: Adult   Pulse: 63   Resp: 17   Temp: 98.3 °F (36.8 °C)   TempSrc: Oral   SpO2: 96%   Weight: 84.8 kg (187 lb)   Height: 172.7 cm (68\")     Physical Exam  Vitals and nursing note reviewed.   Constitutional:       Appearance: Normal appearance.   HENT:      Head: Normocephalic and atraumatic.      Right Ear: Tympanic membrane, ear canal and external ear normal.      Left Ear: Tympanic membrane, ear canal and external ear normal.      Nose: Nose normal.      Mouth/Throat:      Mouth: Mucous membranes are moist.      Pharynx: Oropharynx is clear.   Eyes:      Extraocular Movements: Extraocular movements intact.      Pupils: Pupils are equal, round, and reactive to light.   Cardiovascular:      Rate and Rhythm: Normal rate and regular rhythm.      Pulses: Normal pulses.      Heart sounds: Normal heart sounds.   Pulmonary:      Effort: Pulmonary effort is normal.      Breath sounds: Normal breath sounds.   Abdominal:      General: Abdomen is flat. Bowel sounds are normal.      Palpations: Abdomen is soft.   Musculoskeletal:         General: Normal range of motion.      Cervical back: Normal range of motion and neck supple.   Skin:     General: Skin is warm and dry.      Capillary Refill: Capillary refill takes less than 2 seconds.   Neurological:      General: No focal deficit present.      Mental Status: He is alert and oriented to person, place, and time.   Psychiatric:         Mood and Affect: Mood normal.         Behavior: Behavior normal.         Thought Content: " Thought content normal.         Judgment: Judgment normal.            BMI is >= 25 and <30. (Overweight) The following options were offered after discussion;: weight loss educational material (shared in after visit summary), exercise counseling/recommendations, and nutrition counseling/recommendations     CMP          2/13/2025    07:49   CMP   Glucose 99    BUN 17    Creatinine 1.16    EGFR 69    Sodium 142    Potassium 5.2    Chloride 107    Calcium 10.1    Total Protein 6.5    Albumin 4.3    Globulin 2.2    Total Bilirubin 0.3    Alkaline Phosphatase 94    AST (SGOT) 20    ALT (SGPT) 24    BUN/Creatinine Ratio 15      CBC w/diff          2/13/2025    07:49   CBC w/Diff   WBC 6.8    RBC 5.38    Hemoglobin 15.8    Hematocrit 46.7    MCV 87    MCH 29.4    MCHC 33.8    RDW 12.4    Platelets 274    Neutrophil Rel % 59    Lymphocyte Rel % 27    Monocyte Rel % 8    Eosinophil Rel % 5    Basophil Rel % 1      Lipid Panel          2/13/2025    07:49   Lipid Panel   Total Cholesterol 160    Triglycerides 79    HDL Cholesterol 40    VLDL Cholesterol 15    LDL Cholesterol  105    LDL/HDL Ratio 2.6      TSH          2/13/2025    07:49   TSH   TSH 0.897      Most Recent A1C          2/13/2025    07:49   HGBA1C Most Recent   Hemoglobin A1C 6.1    Blood work reviewed and discussed with patient      ASSESSMENT/ PLAN:    Diagnoses and all orders for this visit:    1. Encounter for annual physical exam (Primary)    2. Primary hypertension    3. Erectile dysfunction, unspecified erectile dysfunction type    4. Prediabetes  -     Hemoglobin A1c; Future    5. Elevated prostate specific antigen (PSA)  -     PSA SCREENING; Future  -     PSA Total+% Free (Serial); Future    6. Elevated LDL cholesterol level  -     Lipid Panel With LDL / HDL Ratio; Future    7. Special screening for malignant neoplasm of prostate  -     PSA SCREENING; Future  -     PSA Total+% Free (Serial); Future    8. Overweight with body mass index (BMI) of 28 to 28.9  in adult      Procedures     Management Plan:   Patient is going to try to modify his lifestyle and improve all of his numbers in labs.   An After Visit Summary was printed and given to the patient at discharge.    Follow-up: Return in about 3 months (around 5/26/2025) for Recheck with lipids, a1c, psa.         Atul Ballard, ANTHONY 2/26/2025 13:22 CST  This note was electronically signed.

## 2025-03-28 DIAGNOSIS — I10 PRIMARY HYPERTENSION: ICD-10-CM

## 2025-03-28 RX ORDER — LISINOPRIL 40 MG/1
40 TABLET ORAL EVERY MORNING
Qty: 90 TABLET | Refills: 1 | Status: SHIPPED | OUTPATIENT
Start: 2025-03-28

## 2025-03-28 NOTE — TELEPHONE ENCOUNTER
Rx Refill Note  Requested Prescriptions     Pending Prescriptions Disp Refills    lisinopril (PRINIVIL,ZESTRIL) 40 MG tablet 90 tablet 3     Sig: Take 1 tablet by mouth Every Morning.      Last office visit with prescribing clinician: 2/26/2025   Next office visit with prescribing clinician: 5/28/2025       Cara Brenner MA  03/28/25, 07:40 CDT

## 2025-05-16 DIAGNOSIS — R73.03 PREDIABETES: Chronic | ICD-10-CM

## 2025-05-16 DIAGNOSIS — Z12.5 SCREENING FOR PROSTATE CANCER: ICD-10-CM

## 2025-05-16 DIAGNOSIS — I10 PRIMARY HYPERTENSION: Primary | ICD-10-CM

## 2025-05-17 LAB
ALBUMIN SERPL-MCNC: 4.2 G/DL (ref 3.9–4.9)
ALP SERPL-CCNC: 92 IU/L (ref 44–121)
ALT SERPL-CCNC: 17 IU/L (ref 0–44)
AST SERPL-CCNC: 18 IU/L (ref 0–40)
BASOPHILS # BLD AUTO: 0.1 X10E3/UL (ref 0–0.2)
BASOPHILS NFR BLD AUTO: 1 %
BILIRUB SERPL-MCNC: 0.6 MG/DL (ref 0–1.2)
BUN SERPL-MCNC: 18 MG/DL (ref 8–27)
BUN/CREAT SERPL: 17 (ref 10–24)
CALCIUM SERPL-MCNC: 9.8 MG/DL (ref 8.6–10.2)
CHLORIDE SERPL-SCNC: 105 MMOL/L (ref 96–106)
CO2 SERPL-SCNC: 23 MMOL/L (ref 20–29)
CREAT SERPL-MCNC: 1.07 MG/DL (ref 0.76–1.27)
EGFRCR SERPLBLD CKD-EPI 2021: 77 ML/MIN/1.73
EOSINOPHIL # BLD AUTO: 0.3 X10E3/UL (ref 0–0.4)
EOSINOPHIL NFR BLD AUTO: 4 %
ERYTHROCYTE [DISTWIDTH] IN BLOOD BY AUTOMATED COUNT: 13.1 % (ref 11.6–15.4)
GLOBULIN SER CALC-MCNC: 2.3 G/DL (ref 1.5–4.5)
GLUCOSE SERPL-MCNC: 92 MG/DL (ref 70–99)
HBA1C MFR BLD: 6.1 % (ref 4.8–5.6)
HCT VFR BLD AUTO: 47.8 % (ref 37.5–51)
HGB BLD-MCNC: 15.6 G/DL (ref 13–17.7)
IMM GRANULOCYTES # BLD AUTO: 0 X10E3/UL (ref 0–0.1)
IMM GRANULOCYTES NFR BLD AUTO: 0 %
LYMPHOCYTES # BLD AUTO: 2.6 X10E3/UL (ref 0.7–3.1)
LYMPHOCYTES NFR BLD AUTO: 36 %
MCH RBC QN AUTO: 29.1 PG (ref 26.6–33)
MCHC RBC AUTO-ENTMCNC: 32.6 G/DL (ref 31.5–35.7)
MCV RBC AUTO: 89 FL (ref 79–97)
MONOCYTES # BLD AUTO: 0.6 X10E3/UL (ref 0.1–0.9)
MONOCYTES NFR BLD AUTO: 9 %
NEUTROPHILS # BLD AUTO: 3.6 X10E3/UL (ref 1.4–7)
NEUTROPHILS NFR BLD AUTO: 50 %
PLATELET # BLD AUTO: 318 X10E3/UL (ref 150–450)
POTASSIUM SERPL-SCNC: 4.8 MMOL/L (ref 3.5–5.2)
PROT SERPL-MCNC: 6.5 G/DL (ref 6–8.5)
PSA SERPL-MCNC: 3.2 NG/ML (ref 0–4)
RBC # BLD AUTO: 5.36 X10E6/UL (ref 4.14–5.8)
SODIUM SERPL-SCNC: 141 MMOL/L (ref 134–144)
WBC # BLD AUTO: 7.2 X10E3/UL (ref 3.4–10.8)

## 2025-05-27 ENCOUNTER — OFFICE VISIT (OUTPATIENT)
Dept: FAMILY MEDICINE CLINIC | Facility: CLINIC | Age: 65
End: 2025-05-27
Payer: COMMERCIAL

## 2025-05-27 VITALS
DIASTOLIC BLOOD PRESSURE: 68 MMHG | SYSTOLIC BLOOD PRESSURE: 100 MMHG | BODY MASS INDEX: 27.55 KG/M2 | WEIGHT: 181.8 LBS | TEMPERATURE: 98.2 F | OXYGEN SATURATION: 98 % | RESPIRATION RATE: 16 BRPM | HEIGHT: 68 IN | HEART RATE: 91 BPM

## 2025-05-27 DIAGNOSIS — R97.20 INCREASED PROSTATE SPECIFIC ANTIGEN (PSA) VELOCITY: Primary | ICD-10-CM

## 2025-05-27 DIAGNOSIS — I10 PRIMARY HYPERTENSION: ICD-10-CM

## 2025-05-27 DIAGNOSIS — R73.03 PREDIABETES: ICD-10-CM

## 2025-05-27 PROCEDURE — 99213 OFFICE O/P EST LOW 20 MIN: CPT | Performed by: NURSE PRACTITIONER

## 2025-05-27 RX ORDER — LISINOPRIL 40 MG/1
40 TABLET ORAL EVERY MORNING
Qty: 90 TABLET | Refills: 1 | Status: SHIPPED | OUTPATIENT
Start: 2025-05-27

## 2025-05-27 NOTE — PROGRESS NOTES
"Chief Complaint   Patient presents with    lab results    Hypertension    Prediabetes        Subjective   Dell Colón is a 64 y.o. 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    No Known Allergies      OBJECTIVE:  Vitals:    05/27/25 1337   BP: 100/68   BP Location: Left arm   Patient Position: Sitting   Cuff Size: Adult   Pulse: 91   Resp: 16   Temp: 98.2 °F (36.8 °C)   TempSrc: Infrared   SpO2: 98%   Weight: 82.5 kg (181 lb 12.8 oz)   Height: 172.7 cm (68\")     Physical Exam  Vitals and nursing note reviewed.   Constitutional:       Appearance: Normal appearance.   HENT:      Head: Normocephalic and atraumatic.      Right Ear: Tympanic membrane, ear canal and external ear normal.      Left Ear: Tympanic membrane, ear canal and external ear normal.      Nose: Nose normal.      Mouth/Throat:      Mouth: Mucous membranes are moist.      Pharynx: Oropharynx is clear.   Eyes:      Extraocular Movements: Extraocular movements intact.      Pupils: Pupils are equal, round, and reactive to light.   Cardiovascular:      Rate and Rhythm: Normal rate and regular rhythm.      Pulses: Normal pulses.      Heart sounds: Normal heart sounds.   Pulmonary:      Effort: Pulmonary effort is normal.      Breath sounds: Normal breath sounds.   Abdominal:      General: Abdomen is flat. Bowel sounds are normal.      Palpations: Abdomen is soft.   Musculoskeletal:         General: Normal range of motion.      Cervical back: Normal range of motion and neck supple.   Skin:     General: Skin is warm and dry.      Capillary Refill: Capillary refill takes less than 2 seconds.   Neurological:      General: No focal deficit present.      Mental Status: He is alert and oriented to person, place, and time.   Psychiatric:         Mood and Affect: Mood normal.         Behavior: Behavior normal.         Thought Content: Thought content normal.         " Judgment: Judgment normal.                    ASSESSMENT/ PLAN:    Diagnoses and all orders for this visit:    1. Increased prostate specific antigen (PSA) velocity (Primary)    2. Primary hypertension  -     lisinopril (PRINIVIL,ZESTRIL) 40 MG tablet; Take 1 tablet by mouth Every Morning.  Dispense: 90 tablet; Refill: 1    3. Prediabetes      Procedures     Management Plan:   Perform self prostate exam.  An After Visit Summary was printed and given to the patient at discharge.    Follow-up: Return in about 6 months (around 11/27/2025) for Recheck with labs prior.         Atul Ballard, ANTHONY 5/27/2025 18:00 CDT  This note was electronically signed.

## 2025-08-21 ENCOUNTER — TELEPHONE (OUTPATIENT)
Dept: FAMILY MEDICINE CLINIC | Facility: CLINIC | Age: 65
End: 2025-08-21
Payer: COMMERCIAL

## (undated) DEVICE — PATIENT RETURN ELECTRODE, SINGLE-USE, CONTACT QUALITY MONITORING, ADULT, WITH 9FT CORD, FOR PATIENTS WEIGING OVER 33LBS. (15KG): Brand: MEGADYNE

## (undated) DEVICE — ADHS SKIN PREMIERPRO EXOFIN TOPICAL HI/VISC .5ML

## (undated) DEVICE — SOL IRR NACL 0.9PCT BT 1000ML

## (undated) DEVICE — TRAP FLD MINIVAC MEGADYNE 100ML

## (undated) DEVICE — STERILE POLYISOPRENE POWDER-FREE SURGICAL GLOVES: Brand: PROTEXIS

## (undated) DEVICE — PK MAJ PROC LF 60

## (undated) DEVICE — TBG PENCL TELESCP MEGADYNE SMOKE EVAC 10FT

## (undated) DEVICE — GOWN,PREVENTION PLUS,XLNG/XXLARGE,STRL: Brand: MEDLINE

## (undated) DEVICE — STERILE POLYISOPRENE POWDER-FREE SURGICAL GLOVES WITH EMOLLIENT COATING: Brand: PROTEXIS

## (undated) DEVICE — GLV SURG SIGNATURE ESSENTIAL PF LTX SZ6.5

## (undated) DEVICE — GLV SURG SIGNATURE ESSENTIAL PF LTX SZ7